# Patient Record
Sex: MALE | Race: ASIAN | Employment: STUDENT | URBAN - METROPOLITAN AREA
[De-identification: names, ages, dates, MRNs, and addresses within clinical notes are randomized per-mention and may not be internally consistent; named-entity substitution may affect disease eponyms.]

---
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
## 2025-02-26 ENCOUNTER — TRANSCRIBE ORDERS (OUTPATIENT)
Dept: OTHER | Age: 20
End: 2025-02-26

## 2025-02-26 ENCOUNTER — MEDICAL CORRESPONDENCE (OUTPATIENT)
Dept: HEALTH INFORMATION MANAGEMENT | Facility: CLINIC | Age: 20
End: 2025-02-26
Payer: COMMERCIAL

## 2025-02-26 DIAGNOSIS — R06.09 DYSPNEA ON EXERTION: Primary | ICD-10-CM

## 2025-02-27 ENCOUNTER — TRANSCRIBE ORDERS (OUTPATIENT)
Dept: OTHER | Age: 20
End: 2025-02-27

## 2025-02-27 DIAGNOSIS — R06.09 DYSPNEA ON EXERTION: ICD-10-CM

## 2025-02-27 DIAGNOSIS — R42 DIZZINESS AND GIDDINESS: Primary | ICD-10-CM

## 2025-03-04 ENCOUNTER — TELEPHONE (OUTPATIENT)
Dept: CARDIOLOGY | Facility: CLINIC | Age: 20
End: 2025-03-04
Payer: COMMERCIAL

## 2025-03-04 NOTE — TELEPHONE ENCOUNTER
This encounter is being sent to inform the clinic that this patient has a referral from Dr. Luis A Mclaughlin for the diagnoses of dizziness and has requested that this patient be seen within next available and/or with any provider.  Based on the availability of our provider(s), we are unable to accommodate this request.    Were all sites offered this patient?  Yes    Does scheduling algorithm request to schedule next available?  Patient has been scheduled for the first available opening with Dr. Paris on 07/07/2025.  We have informed the patient that the clinic will review their referral and reach out if a sooner appointment is medically necessary.

## 2025-03-06 NOTE — TELEPHONE ENCOUNTER
Left Voicemail (1st Attempt) and Sent Mychart (1st Attempt) for the patient to call back and schedule the following:    Appointment type: NEW CARDIO  Provider: CAN OR AUDRA ([id number])  Return date: MAY 2025  Specialty phone number: [phone] OPT 1  Additional appointment(s) needed: N/A  Additonal Notes: CALLING TO OFFER SOONER APPT WITH ALEXEY OR AUDRA IN [facility] FOR SCHEDULING.

## 2025-03-17 ENCOUNTER — ANCILLARY PROCEDURE (OUTPATIENT)
Dept: CARDIOLOGY | Facility: CLINIC | Age: 20
End: 2025-03-17
Attending: FAMILY MEDICINE
Payer: COMMERCIAL

## 2025-03-17 DIAGNOSIS — R06.09 DOE (DYSPNEA ON EXERTION): ICD-10-CM

## 2025-03-17 DIAGNOSIS — R42 DIZZINESS AND GIDDINESS: ICD-10-CM

## 2025-03-17 DIAGNOSIS — R06.09 DYSPNEA ON EXERTION: ICD-10-CM

## 2025-03-17 LAB — LVEF ECHO: NORMAL

## 2025-03-17 PROCEDURE — 94729 DIFFUSING CAPACITY: CPT | Performed by: INTERNAL MEDICINE

## 2025-03-17 PROCEDURE — 94726 PLETHYSMOGRAPHY LUNG VOLUMES: CPT | Performed by: INTERNAL MEDICINE

## 2025-03-17 PROCEDURE — 94150 VITAL CAPACITY TEST: CPT | Performed by: INTERNAL MEDICINE

## 2025-03-17 PROCEDURE — 94375 RESPIRATORY FLOW VOLUME LOOP: CPT | Performed by: INTERNAL MEDICINE

## 2025-03-17 PROCEDURE — 93306 TTE W/DOPPLER COMPLETE: CPT | Performed by: INTERNAL MEDICINE

## 2025-03-18 LAB
DLCOUNC-%PRED-PRE: 120 %
DLCOUNC-PRE: 42.12 ML/MIN/MMHG
DLCOUNC-PRED: 34.83 ML/MIN/MMHG
ERV-%PRED-PRE: 56 %
ERV-PRE: 1.11 L
ERV-PRED: 1.96 L
EXPTIME-PRE: 2.92 SEC
FEF2575-%PRED-PRE: 144 %
FEF2575-PRE: 7.25 L/SEC
FEF2575-PRED: 5.02 L/SEC
FEFMAX-%PRED-PRE: 93 %
FEFMAX-PRE: 9.91 L/SEC
FEFMAX-PRED: 10.55 L/SEC
FEV1-%PRED-PRE: 100 %
FEV1-PRE: 4.66 L
FEV1FEV6-PRE: 93 %
FEV1FEV6-PRED: 84 %
FEV1FVC-PRE: 93 %
FEV1FVC-PRED: 86 %
FEV1SVC-PRE: 96 %
FEV1SVC-PRED: 80 %
FIFMAX-PRE: 4.6 L/SEC
FRCPLETH-%PRED-PRE: 82 %
FRCPLETH-PRE: 2.92 L
FRCPLETH-PRED: 3.53 L
FVC-%PRED-PRE: 92 %
FVC-PRE: 5 L
FVC-PRED: 5.43 L
IC-%PRED-PRE: 94 %
IC-PRE: 3.72 L
IC-PRED: 3.92 L
RVPLETH-%PRED-PRE: 105 %
RVPLETH-PRE: 1.81 L
RVPLETH-PRED: 1.72 L
TLCPLETH-%PRED-PRE: 88 %
TLCPLETH-PRE: 6.64 L
TLCPLETH-PRED: 7.53 L
VA-%PRED-PRE: 90 %
VA-PRE: 6.09 L
VC-%PRED-PRE: 83 %
VC-PRE: 4.83 L
VC-PRED: 5.8 L

## 2025-04-05 ENCOUNTER — HEALTH MAINTENANCE LETTER (OUTPATIENT)
Age: 20
End: 2025-04-05